# Patient Record
Sex: FEMALE | Race: WHITE | NOT HISPANIC OR LATINO | Employment: OTHER | URBAN - METROPOLITAN AREA
[De-identification: names, ages, dates, MRNs, and addresses within clinical notes are randomized per-mention and may not be internally consistent; named-entity substitution may affect disease eponyms.]

---

## 2017-02-02 ENCOUNTER — OFFICE VISIT (OUTPATIENT)
Dept: MEDICAL GROUP | Facility: PHYSICIAN GROUP | Age: 82
End: 2017-02-02
Payer: MEDICARE

## 2017-02-02 ENCOUNTER — HOSPITAL ENCOUNTER (OUTPATIENT)
Facility: MEDICAL CENTER | Age: 82
End: 2017-02-02
Attending: NURSE PRACTITIONER
Payer: MEDICARE

## 2017-02-02 VITALS
HEIGHT: 56 IN | WEIGHT: 111 LBS | SYSTOLIC BLOOD PRESSURE: 148 MMHG | OXYGEN SATURATION: 96 % | TEMPERATURE: 98.4 F | BODY MASS INDEX: 24.97 KG/M2 | RESPIRATION RATE: 16 BRPM | DIASTOLIC BLOOD PRESSURE: 76 MMHG | HEART RATE: 110 BPM

## 2017-02-02 DIAGNOSIS — A49.9 UTI (URINARY TRACT INFECTION), BACTERIAL: ICD-10-CM

## 2017-02-02 DIAGNOSIS — R30.0 DYSURIA: ICD-10-CM

## 2017-02-02 DIAGNOSIS — N39.0 UTI (URINARY TRACT INFECTION), BACTERIAL: Primary | ICD-10-CM

## 2017-02-02 DIAGNOSIS — A49.9 UTI (URINARY TRACT INFECTION), BACTERIAL: Primary | ICD-10-CM

## 2017-02-02 DIAGNOSIS — N39.0 UTI (URINARY TRACT INFECTION), BACTERIAL: ICD-10-CM

## 2017-02-02 LAB
APPEARANCE UR: NORMAL
BILIRUB UR STRIP-MCNC: NEGATIVE MG/DL
COLOR UR AUTO: NORMAL
GLUCOSE UR STRIP.AUTO-MCNC: NEGATIVE MG/DL
KETONES UR STRIP.AUTO-MCNC: NEGATIVE MG/DL
LEUKOCYTE ESTERASE UR QL STRIP.AUTO: NORMAL
NITRITE UR QL STRIP.AUTO: NORMAL
PH UR STRIP.AUTO: 6 [PH] (ref 5–8)
PROT UR QL STRIP: 100 MG/DL
RBC UR QL AUTO: NORMAL
SP GR UR STRIP.AUTO: 1.01
UROBILINOGEN UR STRIP-MCNC: NEGATIVE MG/DL

## 2017-02-02 PROCEDURE — 99214 OFFICE O/P EST MOD 30 MIN: CPT | Performed by: NURSE PRACTITIONER

## 2017-02-02 PROCEDURE — G8420 CALC BMI NORM PARAMETERS: HCPCS | Performed by: NURSE PRACTITIONER

## 2017-02-02 PROCEDURE — 87086 URINE CULTURE/COLONY COUNT: CPT

## 2017-02-02 PROCEDURE — 4004F PT TOBACCO SCREEN RCVD TLK: CPT | Performed by: NURSE PRACTITIONER

## 2017-02-02 PROCEDURE — 4040F PNEUMOC VAC/ADMIN/RCVD: CPT | Mod: 8P | Performed by: NURSE PRACTITIONER

## 2017-02-02 PROCEDURE — 81002 URINALYSIS NONAUTO W/O SCOPE: CPT | Performed by: NURSE PRACTITIONER

## 2017-02-02 PROCEDURE — 87186 SC STD MICRODIL/AGAR DIL: CPT

## 2017-02-02 PROCEDURE — G8484 FLU IMMUNIZE NO ADMIN: HCPCS | Performed by: NURSE PRACTITIONER

## 2017-02-02 PROCEDURE — G8432 DEP SCR NOT DOC, RNG: HCPCS | Performed by: NURSE PRACTITIONER

## 2017-02-02 PROCEDURE — 1101F PT FALLS ASSESS-DOCD LE1/YR: CPT | Performed by: NURSE PRACTITIONER

## 2017-02-02 PROCEDURE — 87077 CULTURE AEROBIC IDENTIFY: CPT

## 2017-02-02 RX ORDER — SULFAMETHOXAZOLE AND TRIMETHOPRIM 800; 160 MG/1; MG/1
1 TABLET ORAL 2 TIMES DAILY
Qty: 20 TAB | Refills: 0 | Status: SHIPPED | OUTPATIENT
Start: 2017-02-02 | End: 2017-02-12

## 2017-02-02 NOTE — MR AVS SNAPSHOT
"Laura Colindres   2017 3:40 PM   Office Visit   MRN: [id number]    Department:  [facility]   Dept Phone:  [phone]    Description:  Female : 1927   Provider:  SANTINO Pollard           Reason for Visit     Urinary Frequency with burning     Ear Fullness           Allergies as of 2017     No Known Allergies      You were diagnosed with     UTI (urinary tract infection), bacterial   [527883]  -  Primary     Dysuria   [788.1.ICD-9-CM]         Vital Signs     Blood Pressure Pulse Temperature Respirations Height Weight    148/76 mmHg 110 36.9 °C (98.4 °F) 16 1.422 m (4' 8\") 50.349 kg (111 lb)    Body Mass Index Oxygen Saturation Smoking Status             24.90 kg/m2 96% Current Every Day Smoker         Basic Information     Date Of Birth Sex Race Ethnicity Preferred Language    1927 Female White Non- English      Problem List              ICD-10-CM Priority Class Noted - Resolved    Essential hypertension I10   10/23/2015 - Present    Dysuria R30.0   10/23/2015 - Present    Functional constipation K59.04   10/23/2015 - Present    Current smoker F17.200   10/23/2015 - Present      Health Maintenance        Date Due Completion Dates    IMM DTaP/Tdap/Td Vaccine (1 - Tdap) 1946 ---    PAP SMEAR 1948 ---    IMM ZOSTER VACCINE 1987 ---    IMM PNEUMOCOCCAL 65+ (ADULT) LOW/MEDIUM RISK SERIES (1 of 2 - PCV13) 1992 ---    IMM INFLUENZA (1) 2016 ---            Results     POCT Urinalysis      Component Value Standard Range & Units    POC Color White Negative    POC Appearance Cloudy Negative    POC Leukocyte Esterase Large Negative    POC Nitrites Pink Negative    POC Urobiligen Negative Negative (0.2) mg/dL    POC Protein 100 Negative mg/dL    POC Urine PH 6.0 5.0 - 8.0    POC Blood Moderate Negative    POC Specific Gravity 1.015 <1.005 - >1.030    POC Ketones Negative Negative mg/dL    POC Biliruben Negative Negative mg/dL    POC Glucose Negative " Negative mg/dL                        Current Immunizations     No immunizations on file.      Below and/or attached are the medications your provider expects you to take. Review all of your home medications and newly ordered medications with your provider and/or pharmacist. Follow medication instructions as directed by your provider and/or pharmacist. Please keep your medication list with you and share with your provider. Update the information when medications are discontinued, doses are changed, or new medications (including over-the-counter products) are added; and carry medication information at all times in the event of emergency situations     Allergies:  No Known Allergies          Medications  Valid as of: February 02, 2017 -  6:50 PM    Generic Name Brand Name Tablet Size Instructions for use    Lisinopril (Tab) PRINIVIL 20 MG Take 1 Tab by mouth every day.        Sulfamethoxazole-Trimethoprim (Tab) BACTRIM -160 MG Take 1 Tab by mouth 2 times a day for 10 days.        .                 Medicines prescribed today were sent to:     [facility] - [place], [place] - [address]    [address] [facility] [place] [address]    Phone: [phone] Fax: [phone]    Open 24 Hours?: No      Medication refill instructions:       If your prescription bottle indicates you have medication refills left, it is not necessary to call your provider’s office. Please contact your pharmacy and they will refill your medication.    If your prescription bottle indicates you do not have any refills left, you may request refills at any time through one of the following ways: The online [employer]Yogi system (except Urgent Care), by calling your provider’s office, or by asking your pharmacy to contact your provider’s office with a refill request. Medication refills are processed only during regular business hours and may not be available until the next business day. Your provider may request additional information or to have a follow-up visit  with you prior to refilling your medication.   *Please Note: Medication refills are assigned a new Rx number when refilled electronically. Your pharmacy may indicate that no refills were authorized even though a new prescription for the same medication is available at the pharmacy. Please request the medicine by name with the pharmacy before contacting your provider for a refill.        Your To Do List     Future Labs/Procedures Complete By Expires    URINE CULTURE(NEW)  As directed 2/2/2018         [employer] Access Code: [email or web address]  Expires: 3/4/2017  6:50 PM    [employer]  A secure, online tool to manage your health information     [employer]’s [employer]® is a secure, online tool that connects you to your personalized health information from the privacy of your home -- day or night - making it very easy for you to manage your healthcare. Once the activation process is completed, you can even access your medical information using the [employer] yordan, which is available for free in the Apple Yordan store or Google Play store.     [employer] provides the following levels of access (as shown below):   My Chart Features   [facility] Doctor [facility]  Specialists [facility]  Urgent  Care Non-[facility]  Primary Care  Doctor   Email your healthcare team securely and privately 24/7 X X X    Manage appointments: schedule your next appointment; view details of past/upcoming appointments X      Request prescription refills. X      View recent personal medical records, including lab and immunizations X X X X   View health record, including health history, allergies, medications X X X X   Read reports about your outpatient visits, procedures, consult and ER notes X X X X   See your discharge summary, which is a recap of your hospital and/or ER visit that includes your diagnosis, lab results, and care plan. X X       How to register for [employer]:  1. Go to  [email or web address].  2. Click on the Sign Up Now box, which takes you to  the New Member Sign Up page. You will need to provide the following information:  a. Enter your [employer]in[employer] Access Code exactly as it appears at the top of this page. (You will not need to use this code after you’ve completed the sign-up process. If you do not sign up before the expiration date, you must request a new code.)   b. Enter your date of birth.   c. Enter your home email address.   d. Click Submit, and follow the next screen’s instructions.  3. Create a Siminars ID. This will be your Siminars login ID and cannot be changed, so think of one that is secure and easy to remember.  4. Create a [employer]inars password. You can change your password at any time.  5. Enter your Password Reset Question and Answer. This can be used at a later time if you forget your password.   6. Enter your e-mail address. This allows you to receive e-mail notifications when new information is available in Siminars.  7. Click Sign Up. You can now view your health information.    For assistance activating your [employer] account, call [phone]

## 2017-02-03 NOTE — PROGRESS NOTES
Chief Complaint   Patient presents with   • Urinary Frequency     with burning    • Ear Fullness       HISTORY OF PRESENT ILLNESS: Patient is a 89 y.o. female established patient who presents today to discuss the followin. UTI (urinary tract infection), bacterial 2. Dysuria  Patient is that she has been experiencing dysuria and urinary frequency for the past week or so.  She has been trying to drink more frequent water as well as cranberry juice.  She denies any hematuria, abdominal pain or fever.  She does tend to get urinary tract infections somewhat frequently, last treated in October.    Allergies:Review of patient's allergies indicates no known allergies.    Current Outpatient Prescriptions Ordered in [facility]   Medication Sig Dispense Refill   • sulfamethoxazole-trimethoprim (BACTRIM DS) 800-160 MG tablet Take 1 Tab by mouth 2 times a day for 10 days. 20 Tab 0   • lisinopril (PRINIVIL) 20 MG Tab Take 1 Tab by mouth every day. 90 Tab 3     No current Epic-ordered facility-administered medications on file.       Past Medical History   Diagnosis Date   • Hypertension        Social History   Substance Use Topics   • Smoking status: Current Every Day Smoker -- 1.00 packs/day for 60 years     Types: Cigarettes   • Smokeless tobacco: Never Used   • Alcohol Use: 0.0 oz/week     0 Standard drinks or equivalent per week      Comment: 2-3 drinks nightly-shy       Family Status   Relation Status Death Age   • Mother     • Father       Family History   Problem Relation Age of Onset   • Heart Attack Mother    • Cancer Father      skin   • Heart Attack Father        ROS: see above    Review of Systems   Constitutional: Negative for fever, chills, weight loss and malaise/fatigue.   HENT: Negative for ear pain, nosebleeds, congestion, sore throat and neck pain.    Eyes: Negative for blurred vision.   Respiratory: Negative for cough, sputum production, shortness of breath and wheezing.    Cardiovascular:  "Negative for chest pain, palpitations, orthopnea and leg swelling.   Gastrointestinal: Negative for heartburn, nausea, vomiting and abdominal pain.   Genitourinary: Negative for dysuria, urgency and frequency.   Musculoskeletal: Negative for myalgias, back pain and joint pain.   Skin: Negative for rash and itching.   Neurological: Negative for dizziness, tingling, tremors, sensory change, focal weakness and headaches.    Endo/Heme/Allergies: Does not bruise/bleed easily.   Psychiatric/Behavioral: Negative for depression, suicidal ideas and memory loss.  The patient is not nervous/anxious and does not have insomnia.        Exam:  Blood pressure 148/76, pulse 110, temperature 36.9 °C (98.4 °F), resp. rate 16, height 1.422 m (4' 8\"), weight 50.349 kg (111 lb), SpO2 96 %.  General:  Well nourished, well developed female in NAD  Head is grossly normal.  Neck: Thyroid is not enlarged.  Pulmonary: Normal effort.   Cardiovascular: Tachycardic rate.   Extremities: no clubbing, cyanosis, or edema.  Psych:  Mood and affect are normal.  Answering questions appropriately with good eye contact.    Lab Results   Component Value Date/Time    POC COLOR White 02/02/2017 03:40 PM    POC APPEARANCE Cloudy 02/02/2017 03:40 PM    POC LEUKOCYTE ESTERASE Large 02/02/2017 03:40 PM    POC NITRITES Pink 02/02/2017 03:40 PM    POC UROBILIGEN Negative 02/02/2017 03:40 PM    POC PROTEIN 100 02/02/2017 03:40 PM    POC URINE PH 6.0 02/02/2017 03:40 PM    POC BLOOD Moderate 02/02/2017 03:40 PM    POC SPECIFIC GRAVITY 1.015 02/02/2017 03:40 PM    POC KETONES Negative 02/02/2017 03:40 PM    POC BILIRUBEN Negative 02/02/2017 03:40 PM    POC GLUCOSE Negative 02/02/2017 03:40 PM        Please note that this dictation was created using voice recognition software. I have made every reasonable attempt to correct obvious errors, but I expect that there are errors of grammar and possibly content that I did not discover before finalizing the " note.    Assessment/Plan:    1. UTI (urinary tract infection), bacterial  POCT Urinalysis    URINE CULTURE(NEW)    sulfamethoxazole-trimethoprim (BACTRIM DS) 800-160 MG tablet   2. Dysuria  POCT Urinalysis    URINE CULTURE(NEW)    sulfamethoxazole-trimethoprim (BACTRIM DS) 800-160 MG tablet        1.  Treat based off of last positive urine culture.  Send this urine sample to the lab.  2.  Encourage continued frequent hydration and use of cranberry juice.  3.  Return to clinic if symptoms fail to resolve.

## 2017-02-04 LAB
BACTERIA UR CULT: ABNORMAL
SIGNIFICANT IND 70042: ABNORMAL
SOURCE SOURCE: ABNORMAL

## 2017-02-06 ENCOUNTER — TELEPHONE (OUTPATIENT)
Dept: MEDICAL GROUP | Facility: PHYSICIAN GROUP | Age: 82
End: 2017-02-06

## 2017-03-13 ENCOUNTER — HOSPITAL ENCOUNTER (OUTPATIENT)
Dept: LAB | Facility: MEDICAL CENTER | Age: 82
End: 2017-03-13
Attending: NURSE PRACTITIONER
Payer: MEDICARE

## 2017-03-13 ENCOUNTER — OFFICE VISIT (OUTPATIENT)
Dept: MEDICAL GROUP | Facility: PHYSICIAN GROUP | Age: 82
End: 2017-03-13
Payer: MEDICARE

## 2017-03-13 VITALS
DIASTOLIC BLOOD PRESSURE: 82 MMHG | BODY MASS INDEX: 24.3 KG/M2 | SYSTOLIC BLOOD PRESSURE: 140 MMHG | RESPIRATION RATE: 14 BRPM | OXYGEN SATURATION: 96 % | TEMPERATURE: 98.5 F | HEART RATE: 112 BPM | HEIGHT: 56 IN | WEIGHT: 108 LBS

## 2017-03-13 DIAGNOSIS — N18.30 CKD (CHRONIC KIDNEY DISEASE) STAGE 3, GFR 30-59 ML/MIN (HCC): ICD-10-CM

## 2017-03-13 DIAGNOSIS — R35.0 URINARY FREQUENCY: Primary | ICD-10-CM

## 2017-03-13 DIAGNOSIS — N39.0 RECURRENT UTI (URINARY TRACT INFECTION): ICD-10-CM

## 2017-03-13 LAB
ALBUMIN SERPL BCP-MCNC: 4.2 G/DL (ref 3.2–4.9)
ALBUMIN/GLOB SERPL: 1.2 G/DL
ALP SERPL-CCNC: 155 U/L (ref 30–99)
ALT SERPL-CCNC: 18 U/L (ref 2–50)
ANION GAP SERPL CALC-SCNC: 13 MMOL/L (ref 0–11.9)
APPEARANCE UR: NORMAL
AST SERPL-CCNC: 19 U/L (ref 12–45)
BILIRUB SERPL-MCNC: 0.5 MG/DL (ref 0.1–1.5)
BILIRUB UR STRIP-MCNC: NEGATIVE MG/DL
BUN SERPL-MCNC: 23 MG/DL (ref 8–22)
CALCIUM SERPL-MCNC: 10 MG/DL (ref 8.5–10.5)
CHLORIDE SERPL-SCNC: 96 MMOL/L (ref 96–112)
CO2 SERPL-SCNC: 25 MMOL/L (ref 20–33)
COLOR UR AUTO: YELLOW
CREAT SERPL-MCNC: 1.16 MG/DL (ref 0.5–1.4)
GLOBULIN SER CALC-MCNC: 3.4 G/DL (ref 1.9–3.5)
GLUCOSE SERPL-MCNC: 97 MG/DL (ref 65–99)
GLUCOSE UR STRIP.AUTO-MCNC: NEGATIVE MG/DL
KETONES UR STRIP.AUTO-MCNC: NEGATIVE MG/DL
LEUKOCYTE ESTERASE UR QL STRIP.AUTO: NORMAL
NITRITE UR QL STRIP.AUTO: NORMAL
PH UR STRIP.AUTO: 6 [PH] (ref 5–8)
POTASSIUM SERPL-SCNC: 4.4 MMOL/L (ref 3.6–5.5)
PROT SERPL-MCNC: 7.6 G/DL (ref 6–8.2)
PROT UR QL STRIP: NORMAL MG/DL
RBC UR QL AUTO: NORMAL
SODIUM SERPL-SCNC: 134 MMOL/L (ref 135–145)
SP GR UR STRIP.AUTO: 1.01
UROBILINOGEN UR STRIP-MCNC: NEGATIVE MG/DL

## 2017-03-13 PROCEDURE — 80053 COMPREHEN METABOLIC PANEL: CPT

## 2017-03-13 PROCEDURE — 4040F PNEUMOC VAC/ADMIN/RCVD: CPT | Mod: 8P | Performed by: NURSE PRACTITIONER

## 2017-03-13 PROCEDURE — 36415 COLL VENOUS BLD VENIPUNCTURE: CPT

## 2017-03-13 PROCEDURE — 1101F PT FALLS ASSESS-DOCD LE1/YR: CPT | Performed by: NURSE PRACTITIONER

## 2017-03-13 PROCEDURE — 99214 OFFICE O/P EST MOD 30 MIN: CPT | Performed by: NURSE PRACTITIONER

## 2017-03-13 PROCEDURE — G8420 CALC BMI NORM PARAMETERS: HCPCS | Performed by: NURSE PRACTITIONER

## 2017-03-13 PROCEDURE — 81002 URINALYSIS NONAUTO W/O SCOPE: CPT | Performed by: NURSE PRACTITIONER

## 2017-03-13 PROCEDURE — 4004F PT TOBACCO SCREEN RCVD TLK: CPT | Performed by: NURSE PRACTITIONER

## 2017-03-13 PROCEDURE — G8484 FLU IMMUNIZE NO ADMIN: HCPCS | Performed by: NURSE PRACTITIONER

## 2017-03-13 PROCEDURE — G8432 DEP SCR NOT DOC, RNG: HCPCS | Performed by: NURSE PRACTITIONER

## 2017-03-13 RX ORDER — CIPROFLOXACIN 500 MG/1
500 TABLET, FILM COATED ORAL 2 TIMES DAILY
Qty: 20 TAB | Refills: 0 | Status: SHIPPED | OUTPATIENT
Start: 2017-03-13 | End: 2017-03-23

## 2017-03-13 NOTE — MR AVS SNAPSHOT
"Laura Colindres   3/13/2017 2:40 PM   Office Visit   MRN: [id number]    Department:  [facility]   Dept Phone:  [phone]    Description:  Female : 1927   Provider:  SANTINO Pollard           Reason for Visit     Urinary Frequency x \"a couple of weeks\"      Allergies as of 3/13/2017     No Known Allergies      You were diagnosed with     Urinary frequency   [788.41.ICD-9-CM]  -  Primary     Recurrent UTI (urinary tract infection)   [651106]       CKD (chronic kidney disease) stage 3, GFR 30-59 ml/min   [708117]         Vital Signs     Blood Pressure Pulse Temperature Respirations Height Weight    140/82 mmHg 112 36.9 °C (98.5 °F) 14 1.422 m (4' 8\") 48.988 kg (108 lb)    Body Mass Index Oxygen Saturation Smoking Status             24.23 kg/m2 96% Current Every Day Smoker         Basic Information     Date Of Birth Sex Race Ethnicity Preferred Language    1927 Female White Non- English      Problem List              ICD-10-CM Priority Class Noted - Resolved    Essential hypertension I10   10/23/2015 - Present    Dysuria R30.0   10/23/2015 - Present    Functional constipation K59.04   10/23/2015 - Present    Current smoker F17.200   10/23/2015 - Present    CKD (chronic kidney disease) stage 3, GFR 30-59 ml/min N18.3   3/13/2017 - Present      Health Maintenance        Date Due Completion Dates    IMM DTaP/Tdap/Td Vaccine (1 - Tdap) 1946 ---    PAP SMEAR 1948 ---    IMM ZOSTER VACCINE 1987 ---    IMM PNEUMOCOCCAL 65+ (ADULT) LOW/MEDIUM RISK SERIES (1 of 2 - PCV13) 1992 ---    IMM INFLUENZA (1) 2016 ---            Results     POCT Urinalysis      Component Value Standard Range & Units    POC Color YELLOW Negative    POC Appearance CLOUDY Negative    POC Leukocyte Esterase LARGE Negative    POC Nitrites PINK Negative    POC Urobiligen NEGATIVE Negative (0.2) mg/dL    POC Protein TRACE Negative mg/dL    POC Urine PH 6.0 5.0 - 8.0    POC Blood MODERATE " Negative    POC Specific Gravity 1.015 <1.005 - >1.030    POC Ketones NEGATIVE Negative mg/dL    POC Biliruben NEGATIVE Negative mg/dL    POC Glucose NEGATIVE Negative mg/dL                        Current Immunizations     No immunizations on file.      Below and/or attached are the medications your provider expects you to take. Review all of your home medications and newly ordered medications with your provider and/or pharmacist. Follow medication instructions as directed by your provider and/or pharmacist. Please keep your medication list with you and share with your provider. Update the information when medications are discontinued, doses are changed, or new medications (including over-the-counter products) are added; and carry medication information at all times in the event of emergency situations     Allergies:  No Known Allergies          Medications  Valid as of: March 13, 2017 -  3:00 PM    Generic Name Brand Name Tablet Size Instructions for use    Ciprofloxacin HCl (Tab) CIPRO 500 MG Take 1 Tab by mouth 2 times a day for 10 days.        Lisinopril (Tab) PRINIVIL 20 MG Take 1 Tab by mouth every day.        .                 Medicines prescribed today were sent to:     [facility], [place] - [address]    [address] [facility] [address]    Phone: [phone] Fax: [phone]    Open 24 Hours?: No      Medication refill instructions:       If your prescription bottle indicates you have medication refills left, it is not necessary to call your provider’s office. Please contact your pharmacy and they will refill your medication.    If your prescription bottle indicates you do not have any refills left, you may request refills at any time through one of the following ways: The online [employer] system (except Urgent Care), by calling your provider’s office, or by asking your pharmacy to contact your provider’s office with a refill request. Medication refills are processed only during regular business  hours and may not be available until the next business day. Your provider may request additional information or to have a follow-up visit with you prior to refilling your medication.   *Please Note: Medication refills are assigned a new Rx number when refilled electronically. Your pharmacy may indicate that no refills were authorized even though a new prescription for the same medication is available at the pharmacy. Please request the medicine by name with the pharmacy before contacting your provider for a refill.        Your To Do List     Future Labs/Procedures Complete By Expires    COMP METABOLIC PANEL  As directed 3/13/2018    Standing Orders Interval Expires    URINALYSIS,CULTURE IF INDICATED   3/13/2018    URINALYSIS,CULTURE IF INDICATED   3/13/2018         Spur[employer] Access Code: [email or web address]  Expires: 4/12/2017  1:05 PM    Spur[employer]  A secure, online tool to manage your health information     [employer]’s Spurfly® is a secure, online tool that connects you to your personalized health information from the privacy of your home -- day or night - making it very easy for you to manage your healthcare. Once the activation process is completed, you can even access your medical information using the Spurfly yordan, which is available for free in the Apple Yordan store or Google Play store.     Spurfly provides the following levels of access (as shown below):   My Chart Features   [facility] Doctor [facility]  Specialists [facility]  Urgent  Care Non-[facility]  Primary Care  Doctor   Email your healthcare team securely and privately 24/7 X X X    Manage appointments: schedule your next appointment; view details of past/upcoming appointments X      Request prescription refills. X      View recent personal medical records, including lab and immunizations X X X X   View health record, including health history, allergies, medications X X X X   Read reports about your outpatient visits, procedures, consult and ER notes X X X  X   See your discharge summary, which is a recap of your hospital and/or ER visit that includes your diagnosis, lab results, and care plan. X X       How to register for [employer]:  1. Go to  [email or web address].  2. Click on the Sign Up Now box, which takes you to the New Member Sign Up page. You will need to provide the following information:  a. Enter your [employer] Access Code exactly as it appears at the top of this page. (You will not need to use this code after you’ve completed the sign-up process. If you do not sign up before the expiration date, you must request a new code.)   b. Enter your date of birth.   c. Enter your home email address.   d. Click Submit, and follow the next screen’s instructions.  3. Create a [employer] ID. This will be your [employer] login ID and cannot be changed, so think of one that is secure and easy to remember.  4. Create a [employer] password. You can change your password at any time.  5. Enter your Password Reset Question and Answer. This can be used at a later time if you forget your password.   6. Enter your e-mail address. This allows you to receive e-mail notifications when new information is available in [employer].  7. Click Sign Up. You can now view your health information.    For assistance activating your [employer] account, call [phone]

## 2017-03-15 ENCOUNTER — TELEPHONE (OUTPATIENT)
Dept: MEDICAL GROUP | Facility: PHYSICIAN GROUP | Age: 82
End: 2017-03-15

## 2017-03-15 NOTE — TELEPHONE ENCOUNTER
----- Message from SANTINO Pollard sent at 3/15/2017  6:48 AM PDT -----  Labs are stable.  There is some evidence of mild dehydration.  Sodium levels are the highest they have been in over a year.  We will continue to monitor.  Thank you

## 2017-07-05 ENCOUNTER — HOSPITAL ENCOUNTER (OUTPATIENT)
Facility: MEDICAL CENTER | Age: 82
End: 2017-07-05
Attending: NURSE PRACTITIONER
Payer: MEDICARE

## 2017-07-05 DIAGNOSIS — N39.0 RECURRENT UTI (URINARY TRACT INFECTION): ICD-10-CM

## 2017-07-05 DIAGNOSIS — R35.0 URINARY FREQUENCY: ICD-10-CM

## 2017-07-05 LAB
APPEARANCE UR: CLEAR
BACTERIA #/AREA URNS HPF: ABNORMAL /HPF
BILIRUB UR QL STRIP.AUTO: NEGATIVE
COLOR UR: YELLOW
CULTURE IF INDICATED INDCX: YES UA CULTURE
EPI CELLS #/AREA URNS HPF: ABNORMAL /HPF
GLUCOSE UR STRIP.AUTO-MCNC: NEGATIVE MG/DL
KETONES UR STRIP.AUTO-MCNC: NEGATIVE MG/DL
LEUKOCYTE ESTERASE UR QL STRIP.AUTO: ABNORMAL
MICRO URNS: ABNORMAL
NITRITE UR QL STRIP.AUTO: NEGATIVE
PH UR STRIP.AUTO: 7 [PH]
PROT UR QL STRIP: NEGATIVE MG/DL
RBC # URNS HPF: ABNORMAL /HPF
RBC UR QL AUTO: ABNORMAL
SP GR UR STRIP.AUTO: 1.01
WBC #/AREA URNS HPF: ABNORMAL /HPF

## 2017-07-05 PROCEDURE — 81001 URINALYSIS AUTO W/SCOPE: CPT

## 2017-07-05 PROCEDURE — 87077 CULTURE AEROBIC IDENTIFY: CPT

## 2017-07-05 PROCEDURE — 87086 URINE CULTURE/COLONY COUNT: CPT

## 2017-07-05 PROCEDURE — 87186 SC STD MICRODIL/AGAR DIL: CPT

## 2017-07-07 ENCOUNTER — TELEPHONE (OUTPATIENT)
Dept: MEDICAL GROUP | Facility: PHYSICIAN GROUP | Age: 82
End: 2017-07-07

## 2017-07-07 DIAGNOSIS — N39.0 UTI DUE TO KLEBSIELLA SPECIES: ICD-10-CM

## 2017-07-07 DIAGNOSIS — B96.89 UTI DUE TO KLEBSIELLA SPECIES: ICD-10-CM

## 2017-07-07 LAB
BACTERIA UR CULT: ABNORMAL
BACTERIA UR CULT: ABNORMAL
SIGNIFICANT IND 70042: ABNORMAL
SOURCE SOURCE: ABNORMAL

## 2017-07-07 RX ORDER — NITROFURANTOIN 25; 75 MG/1; MG/1
100 CAPSULE ORAL 2 TIMES DAILY
Qty: 14 CAP | Refills: 0 | Status: SHIPPED | OUTPATIENT
Start: 2017-07-07 | End: 2017-07-14

## 2017-07-07 NOTE — TELEPHONE ENCOUNTER
Patient's daughter came in and would like to know the results of the patients urine culture.  Please advise.  Thank you.

## 2017-07-08 NOTE — TELEPHONE ENCOUNTER
Urine culture was positive for UTI with Klebsiella.  Antibiotics have been sent in [facility].

## 2017-07-10 ENCOUNTER — TELEPHONE (OUTPATIENT)
Dept: MEDICAL GROUP | Facility: PHYSICIAN GROUP | Age: 82
End: 2017-07-10

## 2017-07-10 NOTE — TELEPHONE ENCOUNTER
----- Message from SANTINO Pollard sent at 7/7/2017  8:29 PM PDT -----  Urine culture is positive.  Please advise patient to complete entire course of antibiotic.  Thank you

## 2017-07-19 ENCOUNTER — HOSPITAL ENCOUNTER (OUTPATIENT)
Dept: LAB | Facility: MEDICAL CENTER | Age: 82
End: 2017-07-19
Attending: NURSE PRACTITIONER
Payer: MEDICARE

## 2017-07-19 DIAGNOSIS — R35.0 URINARY FREQUENCY: ICD-10-CM

## 2017-07-19 DIAGNOSIS — N39.0 RECURRENT UTI (URINARY TRACT INFECTION): ICD-10-CM

## 2017-07-19 LAB
APPEARANCE UR: CLEAR
BACTERIA #/AREA URNS HPF: NEGATIVE /HPF
BILIRUB UR QL STRIP.AUTO: NEGATIVE
COLOR UR: YELLOW
CULTURE IF INDICATED INDCX: YES UA CULTURE
EPI CELLS #/AREA URNS HPF: NEGATIVE /HPF
GLUCOSE UR STRIP.AUTO-MCNC: NEGATIVE MG/DL
HYALINE CASTS #/AREA URNS LPF: ABNORMAL /LPF
KETONES UR STRIP.AUTO-MCNC: NEGATIVE MG/DL
LEUKOCYTE ESTERASE UR QL STRIP.AUTO: ABNORMAL
MICRO URNS: ABNORMAL
NITRITE UR QL STRIP.AUTO: NEGATIVE
PH UR STRIP.AUTO: 6.5 [PH]
PROT UR QL STRIP: NEGATIVE MG/DL
RBC # URNS HPF: ABNORMAL /HPF
RBC UR QL AUTO: ABNORMAL
SP GR UR STRIP.AUTO: 1.01
UROBILINOGEN UR STRIP.AUTO-MCNC: 0.2 MG/DL
WBC #/AREA URNS HPF: ABNORMAL /HPF

## 2017-07-19 PROCEDURE — 81001 URINALYSIS AUTO W/SCOPE: CPT

## 2017-07-19 PROCEDURE — 87086 URINE CULTURE/COLONY COUNT: CPT

## 2017-07-21 ENCOUNTER — TELEPHONE (OUTPATIENT)
Dept: MEDICAL GROUP | Facility: PHYSICIAN GROUP | Age: 82
End: 2017-07-21

## 2017-07-21 LAB
BACTERIA UR CULT: NORMAL
SIGNIFICANT IND 70042: NORMAL
SOURCE SOURCE: NORMAL

## 2017-07-21 NOTE — TELEPHONE ENCOUNTER
----- Message from Eusebia Allen M.D. sent at 7/21/2017  9:28 AM PDT -----  No evidence of infection on culture.  Follow up if symptoms are not improving.

## 2017-07-21 NOTE — TELEPHONE ENCOUNTER
Pt notified on results. States she will have her daughter call our office back because she believes the results are wrong.

## 2017-07-24 ENCOUNTER — HOSPITAL ENCOUNTER (OUTPATIENT)
Dept: LAB | Facility: MEDICAL CENTER | Age: 82
End: 2017-07-24
Attending: NURSE PRACTITIONER
Payer: MEDICARE

## 2017-07-24 DIAGNOSIS — N39.0 RECURRENT UTI (URINARY TRACT INFECTION): ICD-10-CM

## 2017-07-24 DIAGNOSIS — R35.0 URINARY FREQUENCY: ICD-10-CM

## 2017-07-24 LAB
APPEARANCE UR: ABNORMAL
BACTERIA #/AREA URNS HPF: ABNORMAL /HPF
BILIRUB UR QL STRIP.AUTO: ABNORMAL
COLOR UR: ABNORMAL
CULTURE IF INDICATED INDCX: YES UA CULTURE
EPI CELLS #/AREA URNS HPF: ABNORMAL /HPF
GLUCOSE UR STRIP.AUTO-MCNC: NEGATIVE MG/DL
KETONES UR STRIP.AUTO-MCNC: NEGATIVE MG/DL
LEUKOCYTE ESTERASE UR QL STRIP.AUTO: ABNORMAL
MICRO URNS: ABNORMAL
NITRITE UR QL STRIP.AUTO: POSITIVE
PH UR STRIP.AUTO: 6 [PH]
PROT UR QL STRIP: 100 MG/DL
RBC UR QL AUTO: ABNORMAL
SP GR UR STRIP.AUTO: 1.02
UROBILINOGEN UR STRIP.AUTO-MCNC: 1 MG/DL
WBC #/AREA URNS HPF: ABNORMAL /HPF

## 2017-07-24 PROCEDURE — 81001 URINALYSIS AUTO W/SCOPE: CPT

## 2017-07-24 PROCEDURE — 87077 CULTURE AEROBIC IDENTIFY: CPT

## 2017-07-24 PROCEDURE — 87086 URINE CULTURE/COLONY COUNT: CPT

## 2017-07-24 PROCEDURE — 87186 SC STD MICRODIL/AGAR DIL: CPT

## 2017-07-26 ENCOUNTER — TELEPHONE (OUTPATIENT)
Dept: MEDICAL GROUP | Facility: PHYSICIAN GROUP | Age: 82
End: 2017-07-26

## 2017-07-26 DIAGNOSIS — A49.9 UTI (URINARY TRACT INFECTION), BACTERIAL: ICD-10-CM

## 2017-07-26 DIAGNOSIS — N39.0 UTI (URINARY TRACT INFECTION), BACTERIAL: ICD-10-CM

## 2017-07-26 LAB
BACTERIA UR CULT: ABNORMAL
SIGNIFICANT IND 70042: ABNORMAL
SOURCE SOURCE: ABNORMAL

## 2017-07-26 RX ORDER — SULFAMETHOXAZOLE AND TRIMETHOPRIM 800; 160 MG/1; MG/1
1 TABLET ORAL 2 TIMES DAILY
Qty: 20 TAB | Refills: 0 | Status: SHIPPED | OUTPATIENT
Start: 2017-07-26 | End: 2017-08-05

## 2017-07-26 NOTE — TELEPHONE ENCOUNTER
----- Message from SANTINO Pollard sent at 7/26/2017  4:32 PM PDT -----  Urine sample reveals active infection with positive urine culture.  I will be sending an antibiotic to the pharmacy for treatment.  Thank you

## 2017-08-01 ENCOUNTER — TELEPHONE (OUTPATIENT)
Dept: HEALTH INFORMATION MANAGEMENT | Facility: OTHER | Age: 82
End: 2017-08-01

## 2017-08-01 ENCOUNTER — PATIENT OUTREACH (OUTPATIENT)
Dept: HEALTH INFORMATION MANAGEMENT | Facility: OTHER | Age: 82
End: 2017-08-01

## 2017-08-01 NOTE — TELEPHONE ENCOUNTER
Patient is over the recommended age and is showing overdue for Pap Smear in Health Maintenance.    Please reply to this message as to whether these tests are appropriate and I will update the Health Maintenance Topic or contact the patient to schedule.

## 2017-08-01 NOTE — PROGRESS NOTES
Attempt #:1    WebIZ Checked & Epic Updated: yes  HealthConnect Verified: no  Verify PCP: yes    Communication Preference Obtained: yes     Review Care Team: yes    Annual Wellness Visit Scheduling  1. Scheduling Status:Not Scheduled. Patient states they are not interested - Is going to be Re-Establishing with New PCP and wants to wait on AWV     Care Gap Scheduling (Attempt to Schedule EACH Overdue Care Gap!)     Health Maintenance Due   Topic Date Due   • Annual Wellness Visit  Declined   • IMM DTaP/Tdap/Td Vaccine (1 - Tdap) Informed   • PAP SMEAR  Exempt   • IMM ZOSTER VACCINE  Informed   • IMM PNEUMOCOCCAL 65+ (ADULT) LOW/MEDIUM RISK SERIES (1 of 2 - PCV13) Informed         MyChart Activation: declined  MyChart Yordan: no  Virtual Visits: no  Opt In to Text Messages: no

## 2017-08-02 ENCOUNTER — TELEPHONE (OUTPATIENT)
Dept: MEDICAL GROUP | Facility: PHYSICIAN GROUP | Age: 82
End: 2017-08-02

## 2017-08-28 ENCOUNTER — HOSPITAL ENCOUNTER (OUTPATIENT)
Facility: MEDICAL CENTER | Age: 82
End: 2017-08-28
Attending: NURSE PRACTITIONER
Payer: MEDICARE

## 2017-08-28 DIAGNOSIS — R35.0 URINARY FREQUENCY: ICD-10-CM

## 2017-08-28 DIAGNOSIS — N39.0 RECURRENT UTI (URINARY TRACT INFECTION): ICD-10-CM

## 2017-08-28 LAB
APPEARANCE UR: ABNORMAL
BACTERIA #/AREA URNS HPF: ABNORMAL /HPF
BILIRUB UR QL STRIP.AUTO: NEGATIVE
COLOR UR: YELLOW
CULTURE IF INDICATED INDCX: YES UA CULTURE
EPI CELLS #/AREA URNS HPF: NEGATIVE /HPF
GLUCOSE UR STRIP.AUTO-MCNC: NEGATIVE MG/DL
HYALINE CASTS #/AREA URNS LPF: ABNORMAL /LPF
KETONES UR STRIP.AUTO-MCNC: NEGATIVE MG/DL
LEUKOCYTE ESTERASE UR QL STRIP.AUTO: ABNORMAL
MICRO URNS: ABNORMAL
NITRITE UR QL STRIP.AUTO: POSITIVE
PH UR STRIP.AUTO: 7 [PH]
PROT UR QL STRIP: NEGATIVE MG/DL
RBC # URNS HPF: ABNORMAL /HPF
RBC UR QL AUTO: ABNORMAL
SP GR UR STRIP.AUTO: 1.01
UROBILINOGEN UR STRIP.AUTO-MCNC: 0.2 MG/DL
WBC #/AREA URNS HPF: ABNORMAL /HPF

## 2017-08-28 PROCEDURE — 87077 CULTURE AEROBIC IDENTIFY: CPT

## 2017-08-28 PROCEDURE — 87186 SC STD MICRODIL/AGAR DIL: CPT

## 2017-08-28 PROCEDURE — 81001 URINALYSIS AUTO W/SCOPE: CPT

## 2017-08-28 PROCEDURE — 87086 URINE CULTURE/COLONY COUNT: CPT

## 2017-08-31 LAB
BACTERIA UR CULT: ABNORMAL
SIGNIFICANT IND 70042: ABNORMAL
SOURCE SOURCE: ABNORMAL

## 2017-09-01 DIAGNOSIS — N39.0 BACTERIAL UTI: ICD-10-CM

## 2017-09-01 DIAGNOSIS — A49.9 BACTERIAL UTI: ICD-10-CM

## 2017-09-01 RX ORDER — SULFAMETHOXAZOLE AND TRIMETHOPRIM 800; 160 MG/1; MG/1
1 TABLET ORAL 2 TIMES DAILY
Qty: 14 TAB | Refills: 0 | Status: SHIPPED | OUTPATIENT
Start: 2017-09-01 | End: 2017-09-08

## 2017-09-01 NOTE — PROGRESS NOTES
Please let patient know that her urine sample is positive for infection. I have submitted a prescription to the pharmacy on file.  Thank you

## 2018-02-12 ENCOUNTER — OFFICE VISIT (OUTPATIENT)
Dept: MEDICAL GROUP | Facility: PHYSICIAN GROUP | Age: 83
End: 2018-02-12
Payer: MEDICARE

## 2018-02-12 VITALS
DIASTOLIC BLOOD PRESSURE: 100 MMHG | OXYGEN SATURATION: 96 % | HEART RATE: 105 BPM | SYSTOLIC BLOOD PRESSURE: 176 MMHG | BODY MASS INDEX: 25.19 KG/M2 | WEIGHT: 112 LBS | HEIGHT: 56 IN | RESPIRATION RATE: 14 BRPM | TEMPERATURE: 98.8 F

## 2018-02-12 DIAGNOSIS — I10 ESSENTIAL HYPERTENSION: ICD-10-CM

## 2018-02-12 PROCEDURE — 99203 OFFICE O/P NEW LOW 30 MIN: CPT | Performed by: INTERNAL MEDICINE

## 2018-02-12 RX ORDER — LISINOPRIL 20 MG/1
20 TABLET ORAL DAILY
Qty: 90 TAB | Refills: 3 | Status: SHIPPED | OUTPATIENT
Start: 2018-02-12 | End: 2018-04-12

## 2018-02-12 ASSESSMENT — PATIENT HEALTH QUESTIONNAIRE - PHQ9: CLINICAL INTERPRETATION OF PHQ2 SCORE: 0

## 2018-02-13 NOTE — PROGRESS NOTES
PRIMARY CARE CLINIC NEW PATIENT H&P  Chief Complaint   Patient presents with   • Hypertension     History of Present Illness     Here with daughter Donna to establish care.     Essential hypertension  Has been on lisinopril 20 mg for a few years. Denies chest pain, shortness of breath.     Current Outpatient Prescriptions   Medication Sig Dispense Refill   • lisinopril (PRINIVIL) 20 MG Tab Take 1 Tab by mouth every day. 90 Tab 3     No current facility-administered medications for this visit.      Past Medical History:   Diagnosis Date   • Essential hypertension 10/23/2015     Past Surgical History:   Procedure Laterality Date   • LUMBAR DECOMPRESSION     • CERVICAL DISK AND FUSION ANTERIOR       Social History   Substance Use Topics   • Smoking status: Current Every Day Smoker     Packs/day: 1.00     Years: 70.00     Types: Cigarettes   • Smokeless tobacco: Never Used   • Alcohol use No     Social History     Social History Narrative    Retired from the [employer]. Lives alone but son spends  and Saturday with her and daughter calls all other days      Family History   Problem Relation Age of Onset   • Heart Attack Mother    • Cancer Father      skin   • Heart Attack Father      Family Status   Relation Status   • Mother    • Father      Allergies: Patient has no known allergies.    ROS  Constitutional: Negative for fatigue/generalized weakness.   HEENT: Negative for  vision changes, hearing changes    Respiratory: Negative for shortness of breath  Cardiovascular: Negative for chest pain, palpitations  Gastrointestinal: Negative for blood in stool, constipation, diarrhea  Genitourinary: Negative for dysuria, polyuria  Musculoskeletal: Negative for myalgias, back pain, and joint pain.   Skin: Negative for rash  Neurological: Negative for numbness, tingling  Psychiatric/Behavioral: Negative for depression, anxiety      Objective   Blood pressure (!) 176/100, pulse (!) 105, temperature 37.1 °C  "(98.8 °F), resp. rate 14, height 1.422 m (4' 8\"), weight 50.8 kg (112 lb), SpO2 96 %. Body mass index is 25.11 kg/m².    General: Alert, oriented. In no acute distress   HEET: EOMI, PERRL, conjunctiva non-injected, sclera non-icteric.  Nares patent with no significant congestion or drainage.  Nikki pinnae, external auditory canals, TM pearly gray with normal light reflex bilaterally.Oral mucous membranes pink and moist with no lesions.  Neck: supple with no cervical, subclavicular lymphadenopathy, JVD, palpable thyroid nodules   Lungs: clear to auscultation bilaterally with good excursion.  CV: regular rate and rhythm.  Abdomen soft, non-distended, non-tender with normal bowel sounds. No hepatosplenomegaly, no masses palpated  Skin: no lesions. Warm, dry   Psychiatric: appropriate mood and affect     Assessment and Plan   The following treatment plan was discussed     1. Essential hypertension  Uncontrolled today. For now will continue her lisinopril at 20 mg daily as she could have element of white coat hypertension. Demonstrated proper blood pressure technique to her and her daughter and asked her to keep a log and follow up in 1 month with home blood pressure readings. Asked them to call if blood pressure readings are persistently elevated > 140/80.   - lisinopril (PRINIVIL) 20 MG Tab; Take 1 Tab by mouth every day.  Dispense: 90 Tab; Refill: 3    Return in about 4 weeks (around 3/12/2018).    Health Maintenance      Health Maintenance Due   Topic Date Due   • Annual Wellness Visit  08/17/1927     Froylan Clark MD  Internal Medicine  [facility]                   "

## 2018-03-12 ENCOUNTER — OFFICE VISIT (OUTPATIENT)
Dept: MEDICAL GROUP | Facility: PHYSICIAN GROUP | Age: 83
End: 2018-03-12
Payer: MEDICARE

## 2018-03-12 VITALS
SYSTOLIC BLOOD PRESSURE: 178 MMHG | DIASTOLIC BLOOD PRESSURE: 100 MMHG | OXYGEN SATURATION: 97 % | WEIGHT: 116.2 LBS | HEIGHT: 56 IN | HEART RATE: 106 BPM | TEMPERATURE: 98.4 F | RESPIRATION RATE: 16 BRPM | BODY MASS INDEX: 26.14 KG/M2

## 2018-03-12 DIAGNOSIS — I10 ESSENTIAL HYPERTENSION: ICD-10-CM

## 2018-03-12 PROCEDURE — 99213 OFFICE O/P EST LOW 20 MIN: CPT | Performed by: INTERNAL MEDICINE

## 2018-03-12 RX ORDER — LISINOPRIL 20 MG/1
20 TABLET ORAL DAILY
Qty: 90 TAB | Refills: 1 | Status: SHIPPED | OUTPATIENT
Start: 2018-03-12

## 2018-03-12 RX ORDER — AMLODIPINE BESYLATE 10 MG/1
10 TABLET ORAL DAILY
Qty: 90 TAB | Refills: 1 | Status: SHIPPED | OUTPATIENT
Start: 2018-03-12 | End: 2018-04-12

## 2018-03-12 NOTE — ASSESSMENT & PLAN NOTE
She has been feeling dizzy taking 1/2 tablet of lisinopril 20 mg twice a day. Says prior to her generic lisinopril prescription I last prescribed she was taking generic prinivil 20 mg daily and tolerated that better. Denies chest pain. Says she has intermittent shortness of breath when moving.

## 2018-03-12 NOTE — PROGRESS NOTES
"PRIMARY CARE CLINIC FOLLOW UP VISIT  Chief Complaint   Patient presents with   • Hypertension       History of Present Illness     Presents with her daughter for follow up of:     Essential hypertension  She has been feeling dizzy taking 1/2 tablet of lisinopril 20 mg twice a day. Says prior to her generic lisinopril prescription I last prescribed she was taking generic prinivil 20 mg daily and tolerated that better. Denies chest pain. Says she has intermittent shortness of breath when moving.       Current Outpatient Prescriptions   Medication Sig Dispense Refill   • amLODIPine (NORVASC) 10 MG Tab Take 1 Tab by mouth every day. 90 Tab 1   • lisinopril (PRINIVIL) 20 MG Tab Take 1 Tab by mouth every day. 90 Tab 1   • lisinopril (PRINIVIL) 20 MG Tab Take 1 Tab by mouth every day. 90 Tab 3     No current facility-administered medications for this visit.      Past Medical History:   Diagnosis Date   • Essential hypertension 10/23/2015     Past Surgical History:   Procedure Laterality Date   • LUMBAR DECOMPRESSION     • CERVICAL DISK AND FUSION ANTERIOR       Social History   Substance Use Topics   • Smoking status: Current Every Day Smoker     Packs/day: 1.00     Years: 70.00     Types: Cigarettes   • Smokeless tobacco: Never Used   • Alcohol use No     Social History     Social History Narrative    Retired from the "[employer]. Lives alone but son spends  and Saturday with her and daughter calls all other days      Family History   Problem Relation Age of Onset   • Heart Attack Mother    • Cancer Father      skin   • Heart Attack Father      Family Status   Relation Status   • Mother    • Father      Allergies: Patient has no known allergies.    ROS  As per HPI above. All other systems reviewed and negative.        Objective   Blood pressure (!) 178/100, pulse (!) 106, temperature 36.9 °C (98.4 °F), resp. rate 16, height 1.422 m (4' 8\"), weight 52.7 kg (116 lb 3.2 oz), SpO2 97 %. Body mass index " is 26.05 kg/m².    General: alert and oriented, pleasant, cooperative  HEENT: Normocephalic, atraumatic.  Cardiovascular: regular rate and rhythm, normal S1/S2  Pulmonary: lungs clear to auscultation bilaterally  Extremities: no bilateral clubbing, cyanosis, or edema   Psychiatric: appropriate mood and affect. Good insight and appropriate judgment     Assessment and Plan   The following treatment plan was discussed     1. Essential hypertension  Still not well within control, would benefit from 24 hour ambulatory blood pressure monitoring. Will re-prescribe her generic prinivil (last prescription was written for generic zestoril which she didn't tolerate well, at the same dose of 20 mg). Will also add amlodipine 10 mg daily, instructed her that she can cut this tablet in 1/2 if she experiences lightheadedness or dizziness, since her blood pressures (see log scanned into media tab) are still uncontrolled. She will follow up with me in 4 weeks.   - amLODIPine (NORVASC) 10 MG Tab; Take 1 Tab by mouth every day.  Dispense: 90 Tab; Refill: 1  - REFERRAL TO 24 HOUR BP MONITORING  - lisinopril (PRINIVIL) 20 MG Tab; Take 1 Tab by mouth every day.  Dispense: 90 Tab; Refill: 1      Healthcare maintenance     Health Maintenance Due   Topic Date Due   • Annual Wellness Visit  08/17/1927       Return in about 4 weeks (around 4/9/2018).    Froylan Clark MD  Internal Medicine  [facility]

## 2018-03-20 ENCOUNTER — TELEPHONE (OUTPATIENT)
Dept: VASCULAR LAB | Facility: MEDICAL CENTER | Age: 83
End: 2018-03-20

## 2018-03-20 NOTE — TELEPHONE ENCOUNTER
When reached by our MA to schedule, patient refused ABPM  MA to inform referring provider.  Defer follow up to pcp pending further patient contact.    Michael Bloch, MD  [facility]ascular Care

## 2018-04-12 ENCOUNTER — OFFICE VISIT (OUTPATIENT)
Dept: MEDICAL GROUP | Facility: PHYSICIAN GROUP | Age: 83
End: 2018-04-12
Payer: MEDICARE

## 2018-04-12 VITALS
BODY MASS INDEX: 26.54 KG/M2 | WEIGHT: 118 LBS | RESPIRATION RATE: 16 BRPM | HEART RATE: 102 BPM | DIASTOLIC BLOOD PRESSURE: 96 MMHG | HEIGHT: 56 IN | TEMPERATURE: 98.2 F | OXYGEN SATURATION: 96 % | SYSTOLIC BLOOD PRESSURE: 178 MMHG

## 2018-04-12 DIAGNOSIS — I10 ESSENTIAL HYPERTENSION: ICD-10-CM

## 2018-04-12 DIAGNOSIS — N39.0 FREQUENT UTI: ICD-10-CM

## 2018-04-12 PROCEDURE — 99213 OFFICE O/P EST LOW 20 MIN: CPT | Performed by: INTERNAL MEDICINE

## 2018-04-12 NOTE — PROGRESS NOTES
"PRIMARY CARE CLINIC FOLLOW UP VISIT  Chief Complaint   Patient presents with   • Hypertension       History of Present Illness     Here with her daughter today for 1 month blood pressure follow up:     Essential hypertension  Her blood pressures at home still remain elevated similar to today's blood pressures, SBPs up to 180s. She's only taking lisinopril 20 mg daily (which she cuts in 1/2 and takes during separate times of the day) but didn't take the amlodipine 10 mg daily. Denies chest pain, changes in vision. She says she always has a headache. Says she has ongoing issues with dizziness.        Current Outpatient Prescriptions   Medication Sig Dispense Refill   • lisinopril (PRINIVIL) 20 MG Tab Take 1 Tab by mouth every day. 90 Tab 1     No current facility-administered medications for this visit.      Past Medical History:   Diagnosis Date   • Essential hypertension 10/23/2015     Past Surgical History:   Procedure Laterality Date   • LUMBAR DECOMPRESSION     • CERVICAL DISK AND FUSION ANTERIOR       Social History   Substance Use Topics   • Smoking status: Current Every Day Smoker     Packs/day: 1.00     Years: 70.00     Types: Cigarettes   • Smokeless tobacco: Never Used   • Alcohol use No     Social History     Social History Narrative    Retired from the [employer]. Lives alone but son spends  and Saturday with her and daughter calls all other days      Family History   Problem Relation Age of Onset   • Heart Attack Mother    • Cancer Father      skin   • Heart Attack Father      Family Status   Relation Status   • Mother    • Father      Allergies: Patient has no known allergies.    ROS  As per HPI above. All other systems reviewed and negative.        Objective   Blood pressure (!) 178/96, pulse (!) 102, temperature 36.8 °C (98.2 °F), resp. rate 16, height 1.422 m (4' 8\"), weight 53.5 kg (118 lb), SpO2 96 %. Body mass index is 26.46 kg/m².    General: alert and oriented, pleasant, " "cooperative  HEENT: Normocephalic, atraumatic.   Cardiovascular: regular rate and rhythm, normal S1/S2  Pulmonary: lungs clear to auscultation bilaterally  Extremities: no edema bilaterally   Skin: warm and dry, no lesions or rashes  Psychiatric: appropriate mood and affect. Good insight and appropriate judgment     Assessment and Plan   The following treatment plan was discussed     1. Essential hypertension  Her blood pressure remains uncontrolled and she didn't want to do the 24 hour blood pressure monitor. I did discuss my concern for end organ damage given her persistently elevated pressures and how hospital evaluation would be the most thorough approach/work up however she declines this and stated \"I am 89 years old\". She doesn't wish to do a 24 hour blood pressure monitor or add additional hypertensive medications to her regimen. Her daughter was in the room and agreed with me that we should honor Laura's wishes to not do additional inpatient work up or add medications to her regimen. Laura is acceptable with continuing just the lisinopril 20 mg of which she takes 1/2 tablets twice daily.     2. Frequent UTI  Her daughter states she has bouts of UTIs and prior provider had given them a standing order for UA when Laura is symptomatic. Provided order for UA with reflex culture if indicated to have on hand.   - URINALYSIS,CULTURE IF INDICATED; Future      Healthcare maintenance     Health Maintenance Due   Topic Date Due   • Annual Wellness Visit  08/17/1927       Return in about 6 months (around 10/12/2018).    Froylan Clark MD  Internal Medicine  [facility]                   "

## 2018-04-12 NOTE — ASSESSMENT & PLAN NOTE
Her blood pressures at home still remain elevated similar to today's blood pressures, SBPs up to 180s. She's only taking lisinopril 20 mg daily (which she cuts in 1/2 and takes during separate times of the day) but didn't take the amlodipine 10 mg daily. Denies chest pain, changes in vision. She says she always has a headache. Says she has ongoing issues with dizziness.

## 2018-05-08 NOTE — PATIENT INSTRUCTIONS
· Generic Prinivil has been sent to your pharmacy since you tolerated this better. You have also been started on amlodipine 10 mg daily. If you have lightheadedness or dizziness with the amlodipine then you may split this tablet in half. Keep recording your blood pressures and bring the blood pressure log to your next visit in 4 weeks    59

## 2018-10-15 ENCOUNTER — OFFICE VISIT (OUTPATIENT)
Dept: URGENT CARE | Facility: PHYSICIAN GROUP | Age: 83
End: 2018-10-15
Payer: MEDICARE

## 2018-10-15 ENCOUNTER — HOSPITAL ENCOUNTER (OUTPATIENT)
Dept: RADIOLOGY | Facility: MEDICAL CENTER | Age: 83
End: 2018-10-15
Attending: PHYSICIAN ASSISTANT
Payer: MEDICARE

## 2018-10-15 VITALS
OXYGEN SATURATION: 94 % | TEMPERATURE: 98.3 F | DIASTOLIC BLOOD PRESSURE: 54 MMHG | BODY MASS INDEX: 23.56 KG/M2 | RESPIRATION RATE: 16 BRPM | SYSTOLIC BLOOD PRESSURE: 140 MMHG | HEIGHT: 60 IN | HEART RATE: 89 BPM | WEIGHT: 120 LBS

## 2018-10-15 DIAGNOSIS — M16.12 OSTEOARTHRITIS OF LEFT HIP, UNSPECIFIED OSTEOARTHRITIS TYPE: ICD-10-CM

## 2018-10-15 DIAGNOSIS — M25.552 PAIN OF LEFT HIP JOINT: ICD-10-CM

## 2018-10-15 DIAGNOSIS — S32.511A CLOSED FRACTURE OF SUPERIOR RAMUS OF RIGHT PUBIS, INITIAL ENCOUNTER (HCC): ICD-10-CM

## 2018-10-15 PROCEDURE — 73521 X-RAY EXAM HIPS BI 2 VIEWS: CPT

## 2018-10-15 PROCEDURE — 73501 X-RAY EXAM HIP UNI 1 VIEW: CPT | Mod: LT

## 2018-10-15 PROCEDURE — 99213 OFFICE O/P EST LOW 20 MIN: CPT | Performed by: PHYSICIAN ASSISTANT

## 2018-10-15 ASSESSMENT — ENCOUNTER SYMPTOMS
SHORTNESS OF BREATH: 0
HIP PAIN: 1
VOMITING: 0
NAUSEA: 0
COUGH: 0
DIARRHEA: 0
ABDOMINAL PAIN: 0
CHILLS: 0
FEVER: 0

## 2018-10-16 NOTE — PROGRESS NOTES
Subjective:   Laura Colindres is a 89 y.o. female who presents for Hip Pain (L hip pain radiating down leg x 4 weeks (did not injure))        Hip Pain   The current episode started 1 to 4 weeks ago. Pertinent negatives include no abdominal pain, chills, coughing, fever, nausea, rash or vomiting.     Patient seen with daughter present.  Together they describe last approximate 4 weeks of limiting pain to left hip.  Patient complains of pain to left lateral hip as well as lateral thigh/femur.  She denies trauma.  She has been able to weight-bear on the side but with significant pain that has worsened over the last week.  She denies past medical history of surgery or injury to left hip.  Reports normal sensation to feet.  Denies radiation of pain.  Denies urinary symptoms denying dysuria frequency urgency or hematuria.   She notes she has been able to ambulate out to the garage for cigarette breaks.    Review of Systems   Constitutional: Negative for chills and fever.   Respiratory: Negative for cough and shortness of breath.    Gastrointestinal: Negative for abdominal pain, diarrhea, nausea and vomiting.   Musculoskeletal: Positive for joint pain ( (pos for left hip pain).   Skin: Negative for rash.     No Known Allergies   Objective:   /54 (BP Location: Left arm, Patient Position: Sitting, BP Cuff Size: Adult)   Pulse 89   Temp 36.8 °C (98.3 °F) (Temporal)   Resp 16   Ht 1.524 m (5')   Wt 54.4 kg (120 lb)   SpO2 94%   BMI 23.44 kg/m²   Physical Exam   Constitutional: She is oriented to person, place, and time. She appears well-developed and well-nourished. No distress.   HENT:   Head: Normocephalic and atraumatic.   Right Ear: External ear normal.   Left Ear: External ear normal.   Nose: Nose normal.   Eyes: Conjunctivae and lids are normal. Right eye exhibits no discharge. Left eye exhibits no discharge. No scleral icterus.   Neck: Neck supple.   Pulmonary/Chest: Effort normal. No respiratory distress.    Musculoskeletal:        Left hip: She exhibits decreased range of motion, decreased strength, tenderness and bony tenderness. She exhibits no swelling, no crepitus, no deformity and no laceration.        Lumbar back: She exhibits no bony tenderness, no swelling, no deformity and no laceration.   Neurological: She is alert and oriented to person, place, and time. She is not disoriented.   Skin: Skin is warm and dry. She is not diaphoretic. No erythema. No pallor.   Psychiatric: Her speech is normal and behavior is normal.   Nursing note and vitals reviewed.   dx hip - Impression       1.  Lucency seen through the RIGHT superior pubic ramus is suspicious for fracture rather than superimposed bowel  2.  Severe LEFT hip osteoarthritis  3.  BILATERAL sacroiliac osteoarthritis  4.  Prior lumbar fusion, incompletely imaged   Reading Provider Reading Date   Rosalio Zaman M.D. Oct 15, 2018   Signing Provider Signing Date Signing Time   Rosalio Zaman M.D. Oct 15, 2018      [phone] Donna      Assessment/Plan:   Assessment    1. Osteoarthritis of left hip, unspecified osteoarthritis type  - REFERRAL TO ORTHOPEDICS    2. Pain of left hip joint  - DX-HIP-UNILATERAL-WITH PELVIS-1 VIEW LEFT; Future  Initial x-ray demonstrated a superior right pubic ramus fracture; based on lack of clinical correlation repeat x-rays demonstrate no pubic ramus fracture but severe left hip  degenerative joint disease referral for those placed  Differential diagnosis, natural history, supportive care, and indications for immediate follow-up discussed.

## 2021-01-14 DIAGNOSIS — Z23 NEED FOR VACCINATION: ICD-10-CM

## 2023-12-05 NOTE — TELEPHONE ENCOUNTER
1. Caller Name: Janet/josef                                         Call Back Number: [phone]      Patient approves a detailed voicemail message: N\A    States they received a standing order for UA culture if indicated and is asking how often it needs to be done  
6 total, every 4 weeks.  
Janet notified.  
Lab called and states they have to have number.  It can be once a month or once a week, they just need something  
Left voicemail for Janet to cb if she has any questions   
Only if symptomatic.    
BP:  136/86  HR: 103  Gen: A&Ox3, NAD  Abd: non-tender, gravid  LE: 2+ pitting edema on b/l LE, no erythema or calf pain upon palpation  SVE: deferred